# Patient Record
Sex: FEMALE | Race: WHITE | HISPANIC OR LATINO | Employment: FULL TIME | URBAN - METROPOLITAN AREA
[De-identification: names, ages, dates, MRNs, and addresses within clinical notes are randomized per-mention and may not be internally consistent; named-entity substitution may affect disease eponyms.]

---

## 2018-04-25 ENCOUNTER — PROCEDURE VISIT (OUTPATIENT)
Dept: OBSTETRICS AND GYNECOLOGY | Facility: CLINIC | Age: 23
End: 2018-04-25

## 2018-04-25 ENCOUNTER — INITIAL PRENATAL (OUTPATIENT)
Dept: OBSTETRICS AND GYNECOLOGY | Facility: CLINIC | Age: 23
End: 2018-04-25

## 2018-04-25 VITALS — DIASTOLIC BLOOD PRESSURE: 56 MMHG | SYSTOLIC BLOOD PRESSURE: 96 MMHG | WEIGHT: 142 LBS

## 2018-04-25 DIAGNOSIS — Z36.89 ENCOUNTER FOR FETAL ANATOMIC SURVEY: Primary | ICD-10-CM

## 2018-04-25 DIAGNOSIS — Z34.92 PRENATAL CARE IN SECOND TRIMESTER: Primary | ICD-10-CM

## 2018-04-25 LAB
BACTERIA SPEC AEROBE CULT: NO GROWTH
C TRACH RRNA SPEC DONR QL NAA+PROBE: NEGATIVE
EXTERNAL ABO GROUPING: (no result)
EXTERNAL AMPHETAMINE SCREEN URINE: NORMAL
EXTERNAL ANTIBODY SCREEN: NORMAL
EXTERNAL CYSTIC FIBROSIS: NORMAL
EXTERNAL HEPATITIS B SURFACE ANTIGEN: NEGATIVE
EXTERNAL RH FACTOR: POSITIVE
EXTERNAL SYPHILIS RPR SCREEN: NEGATIVE
HIV 1+2 AB+HIV1 P24 AG SERPL QL IA: NEGATIVE
N GONORRHOEA DNA SPEC QL NAA+PROBE: NEGATIVE
RUBV IGG SERPL IA-ACNC: (no result)

## 2018-04-25 PROCEDURE — 99213 OFFICE O/P EST LOW 20 MIN: CPT | Performed by: NURSE PRACTITIONER

## 2018-04-25 PROCEDURE — 76805 OB US >/= 14 WKS SNGL FETUS: CPT | Performed by: NURSE PRACTITIONER

## 2018-04-25 NOTE — PROGRESS NOTES
OB follow up > 20 weeks    Chief Complaint   Patient presents with   • Initial Prenatal Visit       Tayler Mccoy is a 22 y.o.  20w5d being seen today for her obstetrical visit. She is getting prenatal care at [facility]. She is here today for her US.  Patient reports no complaints. Fetal movement: normal. +PNV.      Review of Systems  No bleeding. No cramping/contractions. No leaking of fluid. Good fetal movement.       BP 96/56   Wt 64.4 kg (142 lb)   LMP 2017     FHT: 140s  BPM   Uterine Size: size equals dates       Assessment    1) pregnancy at 20w5d- US IMP: Normal 20 wk anatomy US, female. EDC confirmed.         Plan    Continue prenatal vitamins  Reviewed this stage of pregnancy  Problem list updated   Follow up in 4 weeks with [facility]. RTO @ 36 weeks for continued care.       Rhonda Hess, ALEJANDRO  2018  11:11 AM

## 2018-07-23 ENCOUNTER — ROUTINE PRENATAL (OUTPATIENT)
Dept: OBSTETRICS AND GYNECOLOGY | Facility: CLINIC | Age: 23
End: 2018-07-23

## 2018-07-23 ENCOUNTER — PROCEDURE VISIT (OUTPATIENT)
Dept: OBSTETRICS AND GYNECOLOGY | Facility: CLINIC | Age: 23
End: 2018-07-23

## 2018-07-23 DIAGNOSIS — Z34.90 PREGNANCY, UNSPECIFIED GESTATIONAL AGE: Primary | ICD-10-CM

## 2018-07-23 DIAGNOSIS — O26.843 UTERINE SIZE-DATE DISCREPANCY IN THIRD TRIMESTER: Primary | ICD-10-CM

## 2018-07-23 PROCEDURE — 76816 OB US FOLLOW-UP PER FETUS: CPT | Performed by: OBSTETRICS & GYNECOLOGY

## 2018-07-23 PROCEDURE — 99213 OFFICE O/P EST LOW 20 MIN: CPT | Performed by: OBSTETRICS & GYNECOLOGY

## 2018-08-20 ENCOUNTER — ROUTINE PRENATAL (OUTPATIENT)
Dept: OBSTETRICS AND GYNECOLOGY | Facility: CLINIC | Age: 23
End: 2018-08-20

## 2018-08-20 VITALS — DIASTOLIC BLOOD PRESSURE: 65 MMHG | SYSTOLIC BLOOD PRESSURE: 102 MMHG | WEIGHT: 163.3 LBS

## 2018-08-20 DIAGNOSIS — Z34.90 PREGNANCY, UNSPECIFIED GESTATIONAL AGE: Primary | ICD-10-CM

## 2018-08-20 LAB — GP B STREP RRNA SPEC QL PROBE: NEGATIVE

## 2018-08-20 PROCEDURE — 99213 OFFICE O/P EST LOW 20 MIN: CPT | Performed by: OBSTETRICS & GYNECOLOGY

## 2018-08-20 NOTE — PROGRESS NOTES
Ob follow up      Tayler Mccoy is a 22 y.o.  37w3d patient being seen today for her obstetrical visit. Patient reports no complaints. Fetal movement: normal.      ROS - Denies leaking fluid, vaginal bleeding and notes good fetal movement.     /65   Wt 74.1 kg (163 lb 4.8 oz)   LMP 2017     FHT:  145 BPM    Uterine Size: 34 cm   Presentations: cephalic   Pelvic Exam:     Dilation: 1cm    Effacement: 25%    Station:  -3                 Assessment    1) Pregnancy at 37w3d  2) Fetal status reassuring   3) GBS status: negative  4) Labor Plan: no epidural   5) Pediatrician: Dr. Allen   6) Contraception: Nexplanon, info given.       Plan    Labor warnings  Kick counts reviewed   FU 1 week OB int and schedule [facility] or prn      Fatoumata Cruz MD     2018  3:59 PM

## 2018-08-30 ENCOUNTER — ROUTINE PRENATAL (OUTPATIENT)
Dept: OBSTETRICS AND GYNECOLOGY | Facility: CLINIC | Age: 23
End: 2018-08-30

## 2018-08-30 VITALS
HEIGHT: 62 IN | WEIGHT: 165 LBS | BODY MASS INDEX: 30.36 KG/M2 | DIASTOLIC BLOOD PRESSURE: 70 MMHG | SYSTOLIC BLOOD PRESSURE: 110 MMHG

## 2018-08-30 DIAGNOSIS — D50.8 OTHER IRON DEFICIENCY ANEMIA: Primary | ICD-10-CM

## 2018-08-30 LAB
ERYTHROCYTE [DISTWIDTH] IN BLOOD BY AUTOMATED COUNT: 16.1 % (ref 11.5–14.5)
HCT VFR BLD AUTO: 34.6 % (ref 37–47)
HGB BLD-MCNC: 11.6 G/DL (ref 12–16)
MCH RBC QN AUTO: 26.4 PG (ref 27–31)
MCHC RBC AUTO-ENTMCNC: 33.5 G/DL (ref 31–37)
MCV RBC AUTO: 78.8 FL (ref 81–99)
PLATELET # BLD AUTO: 187 10*3/MM3 (ref 140–500)
RBC # BLD AUTO: 4.39 10*6/MM3 (ref 4.2–5.4)
WBC # BLD AUTO: 8.21 10*3/MM3 (ref 4.8–10.8)

## 2018-08-30 PROCEDURE — 99213 OFFICE O/P EST LOW 20 MIN: CPT | Performed by: OBSTETRICS & GYNECOLOGY

## 2018-08-30 NOTE — PROGRESS NOTES
"Ob follow up      Tayler Mccoy is a 22 y.o.  38w6d patient being seen today for her obstetrical visit. Patient reports no complaints. Fetal movement: normal.      ROS - Denies leaking fluid, vaginal bleeding and notes good fetal movement.     /70   Ht 157.5 cm (62\")   Wt 74.8 kg (165 lb)   LMP 2017   BMI 30.18 kg/m²     FHT:  155 BPM    Uterine Size: 35 cm   Presentations: cephalic   Pelvic Exam:     Dilation: 1cm    Effacement: 25%    Station:  -3                 Assessment    1) Pregnancy at 38w6d- check CBC, none done since first visit.   2) Fetal status reassuring   3) GBS status: negative  4) Labor Plan: plans natural  5) Pediatrician: Dr Allen   6) Contraception: undecided  7) S/P Tdap    Plan    Labor warnings  Kick counts reviewed   FU 1 week OB int or prn      Fatoumata Cruz MD     2018  3:06 PM        "

## 2018-09-04 ENCOUNTER — HOSPITAL ENCOUNTER (INPATIENT)
Facility: HOSPITAL | Age: 23
LOS: 2 days | Discharge: HOME | End: 2018-09-07
Attending: OBSTETRICS & GYNECOLOGY | Admitting: OBSTETRICS & GYNECOLOGY

## 2018-09-04 ENCOUNTER — ROUTINE PRENATAL (OUTPATIENT)
Dept: OBSTETRICS AND GYNECOLOGY | Facility: CLINIC | Age: 23
End: 2018-09-04

## 2018-09-04 VITALS — WEIGHT: 165 LBS | BODY MASS INDEX: 30.18 KG/M2 | DIASTOLIC BLOOD PRESSURE: 68 MMHG | SYSTOLIC BLOOD PRESSURE: 112 MMHG

## 2018-09-04 DIAGNOSIS — Z3A.39 39 WEEKS GESTATION OF PREGNANCY: Primary | ICD-10-CM

## 2018-09-04 LAB
AMPHET+METHAMPHET UR QL: NEGATIVE
AMPHETAMINES UR QL: NEGATIVE
BARBITURATES UR QL SCN: NEGATIVE
BENZODIAZ UR QL SCN: NEGATIVE
BUPRENORPHINE SERPL-MCNC: NEGATIVE NG/ML
CANNABINOIDS SERPL QL: NEGATIVE
COCAINE UR QL: NEGATIVE
METHADONE UR QL SCN: NEGATIVE
OPIATES UR QL: NEGATIVE
OXYCODONE UR QL SCN: NEGATIVE
PCP UR QL SCN: NEGATIVE
PROPOXYPH UR QL: NEGATIVE
TRICYCLICS UR QL SCN: NEGATIVE

## 2018-09-04 PROCEDURE — 80306 DRUG TEST PRSMV INSTRMNT: CPT | Performed by: OBSTETRICS & GYNECOLOGY

## 2018-09-04 PROCEDURE — 99212 OFFICE O/P EST SF 10 MIN: CPT | Performed by: OBSTETRICS & GYNECOLOGY

## 2018-09-04 RX ORDER — PRENATAL VIT NO.126/IRON/FOLIC 28MG-0.8MG
1 TABLET ORAL DAILY
COMMUNITY

## 2018-09-04 NOTE — PROGRESS NOTES
OB follow up     Tayler Mccoy is a 22 y.o.  39w4d being seen today for her obstetrical visit.  Patient reports no bleeding, no contractions and no leaking. Fetal movement: normal.     Review of Systems  No bleeding, No cramping/contractions     /68   Wt 74.8 kg (165 lb)   LMP 2017   BMI 30.18 kg/m²     FHT: present BPM   Uterine Size: 36 cm       Assessment/Plan:    1) 22 y.o.  -pregnancy at 39w4d    2)   Encounter Diagnosis   Name Primary?   • 39 weeks gestation of pregnancy Yes       3) Reviewed this stage of pregnancy  4) Problem list updated     Return in about 7 days (around 2018) for OB INT.      Bradly Magallanes MD    2018  4:09 PM

## 2018-09-05 PROBLEM — Z34.90 TERM PREGNANCY: Status: ACTIVE | Noted: 2018-09-05

## 2018-09-05 LAB
ABO GROUP BLD: NORMAL
ABO GROUP BLD: NORMAL
BLD GP AB SCN SERPL QL: NEGATIVE
DEPRECATED RDW RBC AUTO: 45.8 FL (ref 37–54)
ERYTHROCYTE [DISTWIDTH] IN BLOOD BY AUTOMATED COUNT: 16.3 % (ref 11.5–14.5)
HCT VFR BLD AUTO: 35.9 % (ref 37–47)
HGB BLD-MCNC: 12.1 G/DL (ref 12–16)
MCH RBC QN AUTO: 26.8 PG (ref 27–31)
MCHC RBC AUTO-ENTMCNC: 33.7 G/DL (ref 31–37)
MCV RBC AUTO: 79.4 FL (ref 81–99)
PLATELET # BLD AUTO: 177 10*3/MM3 (ref 140–500)
PMV BLD AUTO: 11.7 FL (ref 7.4–10.4)
RBC # BLD AUTO: 4.52 10*6/MM3 (ref 4.2–5.4)
RH BLD: POSITIVE
RH BLD: POSITIVE
T&S EXPIRATION DATE: NORMAL
WBC NRBC COR # BLD: 10.37 10*3/MM3 (ref 4.8–10.8)

## 2018-09-05 PROCEDURE — 59410 OBSTETRICAL CARE: CPT | Performed by: OBSTETRICS & GYNECOLOGY

## 2018-09-05 PROCEDURE — 85027 COMPLETE CBC AUTOMATED: CPT | Performed by: OBSTETRICS & GYNECOLOGY

## 2018-09-05 PROCEDURE — 86900 BLOOD TYPING SEROLOGIC ABO: CPT | Performed by: OBSTETRICS & GYNECOLOGY

## 2018-09-05 PROCEDURE — 86900 BLOOD TYPING SEROLOGIC ABO: CPT

## 2018-09-05 PROCEDURE — 86901 BLOOD TYPING SEROLOGIC RH(D): CPT

## 2018-09-05 PROCEDURE — 86850 RBC ANTIBODY SCREEN: CPT | Performed by: OBSTETRICS & GYNECOLOGY

## 2018-09-05 PROCEDURE — 86901 BLOOD TYPING SEROLOGIC RH(D): CPT | Performed by: OBSTETRICS & GYNECOLOGY

## 2018-09-05 PROCEDURE — S0260 H&P FOR SURGERY: HCPCS | Performed by: OBSTETRICS & GYNECOLOGY

## 2018-09-05 RX ORDER — OXYTOCIN/0.9 % SODIUM CHLORIDE 30/500 ML
PLASTIC BAG, INJECTION (ML) INTRAVENOUS
Status: COMPLETED
Start: 2018-09-05 | End: 2018-09-05

## 2018-09-05 RX ORDER — MINERAL OIL
OIL (ML) MISCELLANEOUS ONCE
Status: DISCONTINUED | OUTPATIENT
Start: 2018-09-05 | End: 2018-09-05

## 2018-09-05 RX ORDER — IBUPROFEN 800 MG/1
TABLET ORAL
Status: COMPLETED
Start: 2018-09-05 | End: 2018-09-05

## 2018-09-05 RX ORDER — ERYTHROMYCIN 5 MG/G
OINTMENT OPHTHALMIC
Status: DISPENSED
Start: 2018-09-05 | End: 2018-09-05

## 2018-09-05 RX ORDER — ONDANSETRON 4 MG/1
4 TABLET, FILM COATED ORAL EVERY 6 HOURS PRN
Status: DISCONTINUED | OUTPATIENT
Start: 2018-09-05 | End: 2018-09-05

## 2018-09-05 RX ORDER — BISACODYL 10 MG
10 SUPPOSITORY, RECTAL RECTAL DAILY PRN
Status: DISCONTINUED | OUTPATIENT
Start: 2018-09-06 | End: 2018-09-07 | Stop reason: HOSPADM

## 2018-09-05 RX ORDER — SODIUM CHLORIDE, SODIUM LACTATE, POTASSIUM CHLORIDE, CALCIUM CHLORIDE 600; 310; 30; 20 MG/100ML; MG/100ML; MG/100ML; MG/100ML
125 INJECTION, SOLUTION INTRAVENOUS CONTINUOUS
Status: DISCONTINUED | OUTPATIENT
Start: 2018-09-05 | End: 2018-09-05

## 2018-09-05 RX ORDER — METHYLERGONOVINE MALEATE 0.2 MG/ML
200 INJECTION INTRAVENOUS ONCE AS NEEDED
Status: DISCONTINUED | OUTPATIENT
Start: 2018-09-05 | End: 2018-09-07 | Stop reason: HOSPADM

## 2018-09-05 RX ORDER — LIDOCAINE HYDROCHLORIDE 10 MG/ML
5 INJECTION, SOLUTION EPIDURAL; INFILTRATION; INTRACAUDAL; PERINEURAL AS NEEDED
Status: DISCONTINUED | OUTPATIENT
Start: 2018-09-05 | End: 2018-09-05

## 2018-09-05 RX ORDER — PRENATAL VIT/IRON FUM/FOLIC AC 27MG-0.8MG
1 TABLET ORAL DAILY
Status: DISCONTINUED | OUTPATIENT
Start: 2018-09-05 | End: 2018-09-07 | Stop reason: HOSPADM

## 2018-09-05 RX ORDER — SODIUM CHLORIDE 0.9 % (FLUSH) 0.9 %
1-10 SYRINGE (ML) INJECTION AS NEEDED
Status: DISCONTINUED | OUTPATIENT
Start: 2018-09-05 | End: 2018-09-07 | Stop reason: HOSPADM

## 2018-09-05 RX ORDER — FENTANYL CITRATE 50 UG/ML
100 INJECTION, SOLUTION INTRAMUSCULAR; INTRAVENOUS
Status: DISCONTINUED | OUTPATIENT
Start: 2018-09-05 | End: 2018-09-05

## 2018-09-05 RX ORDER — MISOPROSTOL 200 UG/1
800 TABLET ORAL AS NEEDED
Status: DISCONTINUED | OUTPATIENT
Start: 2018-09-05 | End: 2018-09-07 | Stop reason: HOSPADM

## 2018-09-05 RX ORDER — TERBUTALINE SULFATE 1 MG/ML
0.25 INJECTION, SOLUTION SUBCUTANEOUS AS NEEDED
Status: DISCONTINUED | OUTPATIENT
Start: 2018-09-05 | End: 2018-09-05

## 2018-09-05 RX ORDER — OXYTOCIN/0.9 % SODIUM CHLORIDE 30/500 ML
650 PLASTIC BAG, INJECTION (ML) INTRAVENOUS ONCE
Status: DISCONTINUED | OUTPATIENT
Start: 2018-09-05 | End: 2018-09-07 | Stop reason: HOSPADM

## 2018-09-05 RX ORDER — HYDROCODONE BITARTRATE AND ACETAMINOPHEN 5; 325 MG/1; MG/1
2 TABLET ORAL EVERY 4 HOURS PRN
Status: DISCONTINUED | OUTPATIENT
Start: 2018-09-05 | End: 2018-09-07 | Stop reason: HOSPADM

## 2018-09-05 RX ORDER — ONDANSETRON 2 MG/ML
4 INJECTION INTRAMUSCULAR; INTRAVENOUS EVERY 6 HOURS PRN
Status: DISCONTINUED | OUTPATIENT
Start: 2018-09-05 | End: 2018-09-05

## 2018-09-05 RX ORDER — DOCUSATE SODIUM 100 MG/1
100 CAPSULE, LIQUID FILLED ORAL 2 TIMES DAILY PRN
Status: DISCONTINUED | OUTPATIENT
Start: 2018-09-05 | End: 2018-09-07 | Stop reason: HOSPADM

## 2018-09-05 RX ORDER — HYDROCODONE BITARTRATE AND ACETAMINOPHEN 5; 325 MG/1; MG/1
1 TABLET ORAL EVERY 4 HOURS PRN
Status: DISCONTINUED | OUTPATIENT
Start: 2018-09-05 | End: 2018-09-07 | Stop reason: HOSPADM

## 2018-09-05 RX ORDER — OXYTOCIN/0.9 % SODIUM CHLORIDE 30/500 ML
85 PLASTIC BAG, INJECTION (ML) INTRAVENOUS ONCE
Status: DISCONTINUED | OUTPATIENT
Start: 2018-09-05 | End: 2018-09-07 | Stop reason: HOSPADM

## 2018-09-05 RX ORDER — SODIUM CHLORIDE 0.9 % (FLUSH) 0.9 %
1-10 SYRINGE (ML) INJECTION AS NEEDED
Status: DISCONTINUED | OUTPATIENT
Start: 2018-09-05 | End: 2018-09-05

## 2018-09-05 RX ORDER — OXYTOCIN/0.9 % SODIUM CHLORIDE 30/500 ML
650 PLASTIC BAG, INJECTION (ML) INTRAVENOUS ONCE
Status: COMPLETED | OUTPATIENT
Start: 2018-09-05 | End: 2018-09-05

## 2018-09-05 RX ORDER — CARBOPROST TROMETHAMINE 250 UG/ML
250 INJECTION, SOLUTION INTRAMUSCULAR AS NEEDED
Status: DISCONTINUED | OUTPATIENT
Start: 2018-09-05 | End: 2018-09-07 | Stop reason: HOSPADM

## 2018-09-05 RX ORDER — IBUPROFEN 800 MG/1
800 TABLET ORAL EVERY 8 HOURS PRN
Status: DISCONTINUED | OUTPATIENT
Start: 2018-09-05 | End: 2018-09-07 | Stop reason: HOSPADM

## 2018-09-05 RX ORDER — LANOLIN 100 %
OINTMENT (GRAM) TOPICAL
Status: DISCONTINUED | OUTPATIENT
Start: 2018-09-05 | End: 2018-09-07 | Stop reason: HOSPADM

## 2018-09-05 RX ORDER — ZOLPIDEM TARTRATE 5 MG/1
5 TABLET ORAL NIGHTLY PRN
Status: DISCONTINUED | OUTPATIENT
Start: 2018-09-05 | End: 2018-09-07 | Stop reason: HOSPADM

## 2018-09-05 RX ORDER — FAMOTIDINE 20 MG/1
20 TABLET, FILM COATED ORAL EVERY 12 HOURS SCHEDULED
Status: DISCONTINUED | OUTPATIENT
Start: 2018-09-05 | End: 2018-09-05

## 2018-09-05 RX ORDER — SODIUM CHLORIDE, SODIUM LACTATE, POTASSIUM CHLORIDE, CALCIUM CHLORIDE 600; 310; 30; 20 MG/100ML; MG/100ML; MG/100ML; MG/100ML
125 INJECTION, SOLUTION INTRAVENOUS CONTINUOUS
Status: DISCONTINUED | OUTPATIENT
Start: 2018-09-05 | End: 2018-09-07 | Stop reason: HOSPADM

## 2018-09-05 RX ORDER — ONDANSETRON 4 MG/1
4 TABLET, FILM COATED ORAL EVERY 8 HOURS PRN
Status: DISCONTINUED | OUTPATIENT
Start: 2018-09-05 | End: 2018-09-07 | Stop reason: HOSPADM

## 2018-09-05 RX ORDER — ONDANSETRON 4 MG/1
4 TABLET, ORALLY DISINTEGRATING ORAL EVERY 6 HOURS PRN
Status: DISCONTINUED | OUTPATIENT
Start: 2018-09-05 | End: 2018-09-05

## 2018-09-05 RX ADMIN — OXYTOCIN 30 UNITS: 10 INJECTION, SOLUTION INTRAMUSCULAR; INTRAVENOUS at 04:10

## 2018-09-05 RX ADMIN — IBUPROFEN 800 MG: 800 TABLET ORAL at 21:29

## 2018-09-05 RX ADMIN — PRENATAL VIT W/ FE FUMARATE-FA TAB 27-0.8 MG 1 TABLET: 27-0.8 TAB at 09:24

## 2018-09-05 RX ADMIN — Medication 30 UNITS: at 04:10

## 2018-09-05 RX ADMIN — IBUPROFEN 800 MG: 800 TABLET ORAL at 04:32

## 2018-09-05 RX ADMIN — WITCH HAZEL 1 PAD: 500 SOLUTION RECTAL; TOPICAL at 21:29

## 2018-09-05 RX ADMIN — DOCUSATE SODIUM 100 MG: 100 CAPSULE, LIQUID FILLED ORAL at 09:24

## 2018-09-05 RX ADMIN — Medication: at 21:29

## 2018-09-05 RX ADMIN — FAMOTIDINE 20 MG: 20 TABLET, FILM COATED ORAL at 09:24

## 2018-09-05 NOTE — H&P
Ms. Mccoy is a rebecca 23 yo  with IUP @ 39.5 weeks admitted in labor at 3 cm and quickly progressed to complete dilation and delivered precipitously. He prenatal care has been regular and uncomplicated and she is GBS negative.   Fatoumata Cruz MD

## 2018-09-05 NOTE — L&D DELIVERY NOTE
HONG Wyatt  Vaginal Delivery Note    Delivery     Delivery: Vaginal, Spontaneous Delivery     YOB: 2020    Time of Birth: 4:05 AM      Anesthesia: None     Delivering clinician: Fatoumata Cruz    Forceps?   No   Vacuum? No    Shoulder dystocia present: No        Delivery narrative:  Ms. Mccoy is a 21 yo  with IUP @ 39.5 weeks admitted in labor at 3 cms. Her prenatal care has been uncomplicated and she is GBS negative. She desired to go naturally and quickly progressed to complete and SROM occurred immediately before her precipitous delivery. When I arrived the infant ha just been born and was vigorous with a good cry. The cord blood was collected and the placenta was delivered spontaneously and intact. She had a small first degree laceration in the midline that was hemostatic and not repaired. EBL was 100 cc. Apgars were 9 & 9. Both mom and infant were doing well and were left to recover in L&D prior to transfer to the postpartum unit.     Infant    Findings: female  infant     Infant observations: Weight: No birth weight on file.   Length:    in  Observations/Comments:         Apgars: 9   @ 1 minute /    9   @ 5 minutes   Infant Name:      Placenta, Cord, and Fluid    Placenta delivered  Spontaneous  at   2018  4:12 AM     Cord: 3 vessels  present.   Nuchal Cord?  no   Cord blood obtained: Yes    Cord gases obtained:  No    Cord gas results: Venous:  No results found for: PHCVEN    Arterial:  No results found for: PHCART     Repair    Episiotomy: None    Lacerations: Yes  Laceration Information  Laceration Repaired?   Perineal: 1st  No     Periurethral:         Labial:         Sulcus:         Vaginal: No       Cervical: No             Estimated Blood Loss: Est. Blood Loss (mL): 100 mL (Filed from Delivery Summary) (18 0400)           Complications  precipitous delivery    Disposition  Mother to Mother Baby/Postpartum  in stable condition currently.  Baby to remains with  mom  in stable condition currently.      Fatouamta Cruz MD  09/05/18  4:21 AM

## 2018-09-05 NOTE — PLAN OF CARE
Problem: Patient Care Overview  Goal: Plan of Care Review  Outcome: Ongoing (interventions implemented as appropriate)   09/05/18 1657   Coping/Psychosocial   Plan of Care Reviewed With patient   Plan of Care Review   Progress improving     Goal: Individualization and Mutuality  Outcome: Ongoing (interventions implemented as appropriate)    Goal: Discharge Needs Assessment  Outcome: Ongoing (interventions implemented as appropriate)   09/05/18 1657   Discharge Needs Assessment   Readmission Within the Last 30 Days no previous admission in last 30 days   Concerns to be Addressed no discharge needs identified   Patient/Family Anticipates Transition to home   Patient/Family Anticipated Services at Transition none   Transportation Concerns car, none   Transportation Anticipated family or friend will provide   Anticipated Changes Related to Illness none   Equipment Needed After Discharge none   Disability   Equipment Currently Used at Home none       Problem: Postpartum (Vaginal Delivery) (Adult,Obstetrics,Pediatric)  Goal: Signs and Symptoms of Listed Potential Problems Will be Absent, Minimized or Managed (Postpartum)  Outcome: Ongoing (interventions implemented as appropriate)   09/05/18 1657   Goal/Outcome Evaluation   Problems Assessed (Postpartum Vaginal Delivery) all   Problems Present (Postpartum Vag Deliv) none

## 2018-09-06 LAB
HCT VFR BLD AUTO: 34.2 % (ref 37–47)
HGB BLD-MCNC: 10.9 G/DL (ref 12–16)

## 2018-09-06 PROCEDURE — 85014 HEMATOCRIT: CPT | Performed by: OBSTETRICS & GYNECOLOGY

## 2018-09-06 PROCEDURE — 85018 HEMOGLOBIN: CPT | Performed by: OBSTETRICS & GYNECOLOGY

## 2018-09-06 PROCEDURE — 99024 POSTOP FOLLOW-UP VISIT: CPT | Performed by: OBSTETRICS & GYNECOLOGY

## 2018-09-06 RX ADMIN — IBUPROFEN 800 MG: 800 TABLET ORAL at 17:15

## 2018-09-06 RX ADMIN — PRENATAL VIT W/ FE FUMARATE-FA TAB 27-0.8 MG 1 TABLET: 27-0.8 TAB at 08:24

## 2018-09-06 RX ADMIN — Medication: at 08:25

## 2018-09-06 RX ADMIN — IBUPROFEN 800 MG: 800 TABLET ORAL at 08:24

## 2018-09-06 RX ADMIN — DOCUSATE SODIUM 100 MG: 100 CAPSULE, LIQUID FILLED ORAL at 17:15

## 2018-09-06 RX ADMIN — DOCUSATE SODIUM 100 MG: 100 CAPSULE, LIQUID FILLED ORAL at 08:24

## 2018-09-06 NOTE — PLAN OF CARE
Problem: Patient Care Overview  Goal: Plan of Care Review  Outcome: Ongoing (interventions implemented as appropriate)   09/06/18 0309   Coping/Psychosocial   Plan of Care Reviewed With patient   Plan of Care Review   Progress improving   OTHER   Outcome Summary VSS, breastfeeding going well, prn pain meds are controlling pain     Goal: Individualization and Mutuality  Outcome: Ongoing (interventions implemented as appropriate)   09/06/18 0309   Individualization   Patient Specific Goals (Include Timeframe) breastfeed infant every 2-3 hours, call nurse for assistance with feedings if needed, maintain acceptable level of pain control   Patient Specific Interventions wake infant as needed for feedings, monitor and record wet and dirty diapers, call RN for prn pain meds     Goal: Discharge Needs Assessment  Outcome: Ongoing (interventions implemented as appropriate)   09/06/18 0309   Discharge Needs Assessment   Readmission Within the Last 30 Days no previous admission in last 30 days   Concerns to be Addressed no discharge needs identified   Patient/Family Anticipates Transition to home   Patient/Family Anticipated Services at Transition none   Transportation Concerns car, none   Transportation Anticipated family or friend will provide   Anticipated Changes Related to Illness none   Equipment Needed After Discharge none   Disability   Equipment Currently Used at Home none     Goal: Interprofessional Rounds/Family Conf  Outcome: Ongoing (interventions implemented as appropriate)   09/06/18 0309   Interdisciplinary Rounds/Family Conf   Participants nursing;patient       Problem: Postpartum (Vaginal Delivery) (Adult,Obstetrics,Pediatric)  Goal: Signs and Symptoms of Listed Potential Problems Will be Absent, Minimized or Managed (Postpartum)  Outcome: Ongoing (interventions implemented as appropriate)   09/06/18 0309   Goal/Outcome Evaluation   Problems Assessed (Postpartum Vaginal Delivery) all   Problems Present  (Postpartum Vag Deliv) pain       Problem: Breastfeeding (Pediatric,[place],NICU)  Goal: Identify Related Risk Factors and Signs and Symptoms  Outcome: Ongoing (interventions implemented as appropriate)   18   Breastfeeding (Pediatric,,NICU)   Related Risk Factors (Breastfeeding) desire for optimal nutrition   Signs and Symptoms (Breastfeeding) nutrition received via breastfeeding     Goal: Effective Breastfeeding  Outcome: Ongoing (interventions implemented as appropriate)   18   Breastfeeding (Pediatric,[place],NICU)   Effective Breastfeeding achieves outcome      18   Breastfeeding (Pediatric,[place],NICU)   Effective Breastfeeding achieves outcome

## 2018-09-06 NOTE — PROGRESS NOTES
"HONG Wyatt  Vaginal Delivery Progress Note    Subjective   Subjective:   Diet: Adequate intake.    Activity level: Normal.    Pain control: Well controlled.      Postpartum Day 1: Vaginal Delivery    The patient feels well.  Her pain is well controlled with analgesics.   She is ambulating well.  Patient describes her bleeding as thin lochia.    Breastfeeding: infant latching.    Objective     Objective   Vital Signs Range for the last 24 hours  Temperature: Temp:  [97.9 °F (36.6 °C)-98.5 °F (36.9 °C)] 97.9 °F (36.6 °C)   Temp Source: Temp src: Oral   BP: BP: ()/(45-74) 92/45   Pulse: Heart Rate:  [68-78] 78   Respirations: Resp:  [16-20] 16   SPO2: SpO2:  [97 %] 97 %   O2 Amount (l/min):     O2 Devices Device (Oxygen Therapy): room air   Weight:       Admit Height:  Height: 157.5 cm (62\")    Physical Exam:  General:  no acute distresss.  Abdomen: abdomen is soft without significant tenderness, masses, organomegaly or guarding. Fundus: appropriate, firm, non tender  Extremities: normal, atraumatic, no cyanosis, and trace edema.       Lab results reviewed:  hgb = 10.9  Rubella:  No results found for: RUBELLAIGGIN Nurse Transcribed from prenatal record --    Rubella Antibodies, IgG   Date Value Ref Range Status   2018 Immune  Final     Rh Status:    RH type   Date Value Ref Range Status   2018 Positive  Final     Immunizations:   Immunization History   Administered Date(s) Administered   • DTaP 2018       Assessment/Plan   Assessment & Plan  Active Problems:    Term pregnancy     (spontaneous vaginal delivery)      Tayler Mccoy is Day 1  post-partum  Vaginal, Spontaneous Delivery    .      Plan:  Continue current care.      Servando Mobley MD  2018  1:45 PM    "

## 2018-09-06 NOTE — PLAN OF CARE
Problem: Postpartum (Vaginal Delivery) (Adult,Obstetrics,Pediatric)  Intervention: Support Life/Role Transition   09/06/18 7271   Psychosocial Support   Family/Support System Care self-care encouraged   Interventions   Trust Relationship/Rapport choices provided   Coping/Psychosocial Interventions   Environmental Support rooming-in facilitated   Parent/Child Attachment Promotion caring behavior modeled;participation in care promoted

## 2018-09-07 ENCOUNTER — TELEPHONE (OUTPATIENT)
Dept: OBSTETRICS AND GYNECOLOGY | Facility: HOSPITAL | Age: 23
End: 2018-09-07

## 2018-09-07 VITALS
SYSTOLIC BLOOD PRESSURE: 94 MMHG | WEIGHT: 165 LBS | RESPIRATION RATE: 20 BRPM | HEART RATE: 74 BPM | DIASTOLIC BLOOD PRESSURE: 57 MMHG | TEMPERATURE: 98.4 F | OXYGEN SATURATION: 96 % | BODY MASS INDEX: 30.36 KG/M2 | HEIGHT: 62 IN

## 2018-09-07 PROBLEM — Z34.90 PREGNANCY: Status: RESOLVED | Noted: 2018-07-23 | Resolved: 2018-09-07

## 2018-09-07 PROBLEM — Z34.90 TERM PREGNANCY: Status: RESOLVED | Noted: 2018-09-05 | Resolved: 2018-09-07

## 2018-09-07 PROCEDURE — 99024 POSTOP FOLLOW-UP VISIT: CPT | Performed by: OBSTETRICS & GYNECOLOGY

## 2018-09-07 RX ORDER — IBUPROFEN 800 MG/1
800 TABLET ORAL EVERY 8 HOURS PRN
Qty: 30 TABLET | Refills: 0 | Status: SHIPPED | OUTPATIENT
Start: 2018-09-07

## 2018-09-07 RX ADMIN — PRENATAL VIT W/ FE FUMARATE-FA TAB 27-0.8 MG 1 TABLET: 27-0.8 TAB at 08:24

## 2018-09-07 NOTE — PLAN OF CARE
Problem: Patient Care Overview  Goal: Plan of Care Review  Outcome: Ongoing (interventions implemented as appropriate)   09/06/18 0309 09/06/18 0824 09/07/18 0427   Coping/Psychosocial   Plan of Care Reviewed With --  patient;mother --    Plan of Care Review   Progress improving --  --    OTHER   Outcome Summary --  --  VSS, breastfeeding going well, pain well controlled, pt in stable postpartum condition     Goal: Individualization and Mutuality  Outcome: Ongoing (interventions implemented as appropriate)   09/06/18 0309   Individualization   Patient Specific Goals (Include Timeframe) breastfeed infant every 2-3 hours, call nurse for assistance with feedings if needed, maintain acceptable level of pain control   Patient Specific Interventions wake infant as needed for feedings, monitor and record wet and dirty diapers, call RN for prn pain meds     Goal: Discharge Needs Assessment  Outcome: Ongoing (interventions implemented as appropriate)   09/06/18 0309 09/07/18 0427   Discharge Needs Assessment   Readmission Within the Last 30 Days --  no previous admission in last 30 days   Concerns to be Addressed --  no discharge needs identified   Patient/Family Anticipates Transition to home --    Patient/Family Anticipated Services at Transition none --    Transportation Concerns car, none --    Transportation Anticipated family or friend will provide --    Anticipated Changes Related to Illness none --    Equipment Needed After Discharge none --    Disability   Equipment Currently Used at Home none --      Goal: Interprofessional Rounds/Family Conf  Outcome: Ongoing (interventions implemented as appropriate)   09/06/18 0309   Interdisciplinary Rounds/Family Conf   Participants nursing;patient       Problem: Postpartum (Vaginal Delivery) (Adult,Obstetrics,Pediatric)  Goal: Signs and Symptoms of Listed Potential Problems Will be Absent, Minimized or Managed (Postpartum)  Outcome: Ongoing (interventions implemented as  appropriate)   18 0309 18 0427   Goal/Outcome Evaluation   Problems Assessed (Postpartum Vaginal Delivery) all --    Problems Present (Postpartum Vag Deliv) --  none       Problem: Breastfeeding (Pediatric,[place],NICU)  Goal: Identify Related Risk Factors and Signs and Symptoms  Outcome: Ongoing (interventions implemented as appropriate)   18 030   Breastfeeding (Pediatric,[place],NICU)   Related Risk Factors (Breastfeeding) desire for optimal nutrition   Signs and Symptoms (Breastfeeding) nutrition received via breastfeeding     Goal: Effective Breastfeeding  Outcome: Ongoing (interventions implemented as appropriate)   18 030   Breastfeeding (Pediatric,,NICU)   Effective Breastfeeding achieves outcome

## 2018-09-07 NOTE — DISCHARGE SUMMARY
"Obstetrical Discharge Form    Primary OB Clinician: DAVID      Gestational Age: 39.5 weeks    Antepartum complications: none    Date of Delivery:  2018     Delivered By: Fatoumata Cruz     Delivery Type: spontaneous vaginal delivery    Tubal Ligation: n/a    Baby: Liveborn female, Apgars 9/9     Anesthesia: none    Intrapartum complications: None    Laceration: none      Feeding method: both breast and bottle       Discharge Date: 2018; Discharge Time: 12:07 PM    BP 94/57 (BP Location: Left arm, Patient Position: Lying)   Pulse 74   Temp 98.4 °F (36.9 °C) (Oral)   Resp 20   Ht 157.5 cm (62\")   Wt 74.8 kg (165 lb)   LMP 2017   SpO2 96%   Breastfeeding? Yes   BMI 30.18 kg/m²      Gen: NAD  Hrt: RRR  Lungs: clear  Abd: soft, NT/ND. Fundus firm and NT.  Ext: no calf tenderness    Results from last 7 days  Lab Units 18  0452   HEMOGLOBIN g/dL 10.9*             Plan:    21yo  s/p     1) PPD#2: Progressing well    2) PP Care: breast and Bottle feeding. Desires Nexplanon for PP contraception.    Patient given written instruction sheet.  Follow-up appointment with [facility] in 6 weeks.    Katie Landin DO  12:07 PM  2018  "

## 2018-10-18 ENCOUNTER — POSTPARTUM VISIT (OUTPATIENT)
Dept: OBSTETRICS AND GYNECOLOGY | Facility: CLINIC | Age: 23
End: 2018-10-18

## 2018-10-18 VITALS
WEIGHT: 151 LBS | BODY MASS INDEX: 27.79 KG/M2 | HEIGHT: 62 IN | SYSTOLIC BLOOD PRESSURE: 98 MMHG | DIASTOLIC BLOOD PRESSURE: 64 MMHG

## 2018-10-18 DIAGNOSIS — Z01.419 PAP SMEAR, LOW-RISK: Primary | ICD-10-CM

## 2018-10-18 DIAGNOSIS — Z11.51 SPECIAL SCREENING EXAMINATION FOR HUMAN PAPILLOMAVIRUS (HPV): ICD-10-CM

## 2018-10-18 LAB
B-HCG UR QL: NEGATIVE
BILIRUB BLD-MCNC: NEGATIVE MG/DL
CLARITY, POC: CLEAR
COLOR UR: YELLOW
GLUCOSE UR STRIP-MCNC: NEGATIVE MG/DL
INTERNAL NEGATIVE CONTROL: NEGATIVE
INTERNAL POSITIVE CONTROL: POSITIVE
KETONES UR QL: NEGATIVE
LEUKOCYTE EST, POC: NEGATIVE
Lab: NORMAL
NITRITE UR-MCNC: NEGATIVE MG/ML
PH UR: 5 [PH] (ref 5–8)
PROT UR STRIP-MCNC: NEGATIVE MG/DL
RBC # UR STRIP: NEGATIVE /UL
SP GR UR: 1.02 (ref 1–1.03)
UROBILINOGEN UR QL: NORMAL

## 2018-10-18 PROCEDURE — 81002 URINALYSIS NONAUTO W/O SCOPE: CPT | Performed by: OBSTETRICS & GYNECOLOGY

## 2018-10-18 PROCEDURE — 99024 POSTOP FOLLOW-UP VISIT: CPT | Performed by: OBSTETRICS & GYNECOLOGY

## 2018-10-18 PROCEDURE — 81025 URINE PREGNANCY TEST: CPT | Performed by: OBSTETRICS & GYNECOLOGY

## 2018-10-18 NOTE — PROGRESS NOTES
"Chief Complaint   Patient presents with   • Postpartum Care        Date of Delivery: 9/5/18  Type of Delivery:  Spontaneous Vaginal Delivery  Infant:  female  Postpartum complications: none    HPI  The patient feels well. Patient describes her bleeding as staining only. She denies depression.     Breastfeeding: without difficulty.    Review of Systems   Constitutional: Negative for appetite change, fatigue, fever and unexpected weight change.   Respiratory: Negative for cough and shortness of breath.    Cardiovascular: Negative for chest pain and palpitations.   Gastrointestinal: Negative for abdominal distention, abdominal pain, constipation, diarrhea and nausea.   Genitourinary: Negative for dyspareunia, dysuria, menstrual problem, pelvic pain and vaginal discharge.   Skin: Negative for color change and rash.   Neurological: Negative for headaches.   Psychiatric/Behavioral: Negative for dysphoric mood. The patient is not nervous/anxious.        The following portions of the patient's history were reviewed and updated as appropriate: allergies, current medications and problem list.             BP 98/64   Ht 157.5 cm (62\")   Wt 68.5 kg (151 lb)   LMP 12/01/2017   Breastfeeding? Yes   BMI 27.62 kg/m²       Physical Exam   Constitutional: She is oriented to person, place, and time. She appears well-developed and well-nourished.   HENT:   Head: Normocephalic and atraumatic.   Neck: Neck supple. No thyromegaly present.   Cardiovascular: Normal rate and regular rhythm.    Pulmonary/Chest: Effort normal and breath sounds normal. Right breast exhibits no inverted nipple, no mass, no nipple discharge, no skin change and no tenderness. Left breast exhibits no inverted nipple, no mass, no nipple discharge, no skin change and no tenderness.   Abdominal: Soft. Bowel sounds are normal. She exhibits no distension and no mass. There is no tenderness. There is no rebound and no guarding. No hernia.   Genitourinary: Uterus " normal. Pelvic exam was performed with patient supine. There is no rash, tenderness or lesion on the right labia. There is no rash, tenderness or lesion on the left labia. Cervix exhibits no motion tenderness, no discharge and no friability. Right adnexum displays no mass, no tenderness and no fullness. Left adnexum displays no mass, no tenderness and no fullness. No erythema, tenderness or bleeding in the vagina. No foreign body in the vagina. No signs of injury around the vagina. No vaginal discharge found.   Neurological: She is alert and oriented to person, place, and time.   Skin: Skin is warm and dry.   Psychiatric: She has a normal mood and affect. Her behavior is normal. Judgment and thought content normal.   Nursing note and vitals reviewed.                1) 6 week postpartum exam - check pap/C/G/T  2) Screen for postpartum depression - denies any issues, has good support  3) Contraception - plans Nexplanon, if she receives here she will need to pay cash price, plans to get it at the [facility].   4) Social - no issues, mom and baby doing well.   5) Return to office - 1 year or prn.         Fatoumata Cruz MD  10/18/18  4:41 PM

## 2018-10-22 LAB
C TRACH RRNA CVX QL NAA+PROBE: NEGATIVE
CONV .: NORMAL
CYTOLOGIST CVX/VAG CYTO: NORMAL
CYTOLOGY CVX/VAG DOC THIN PREP: NORMAL
DX ICD CODE: NORMAL
HIV 1 & 2 AB SER-IMP: NORMAL
N GONORRHOEA RRNA CVX QL NAA+PROBE: NEGATIVE
OTHER STN SPEC: NORMAL
PATH REPORT.FINAL DX SPEC: NORMAL
STAT OF ADQ CVX/VAG CYTO-IMP: NORMAL
T VAGINALIS RRNA SPEC QL NAA+PROBE: NEGATIVE